# Patient Record
Sex: MALE | Race: BLACK OR AFRICAN AMERICAN | NOT HISPANIC OR LATINO | Employment: UNEMPLOYED | URBAN - METROPOLITAN AREA
[De-identification: names, ages, dates, MRNs, and addresses within clinical notes are randomized per-mention and may not be internally consistent; named-entity substitution may affect disease eponyms.]

---

## 2019-12-25 PROBLEM — R06.02 SOB (SHORTNESS OF BREATH): Status: ACTIVE | Noted: 2019-12-25

## 2019-12-26 PROBLEM — N28.1 RENAL CYST: Status: ACTIVE | Noted: 2019-12-26

## 2019-12-26 PROBLEM — A41.9 SEPSIS: Status: ACTIVE | Noted: 2019-12-26

## 2019-12-26 PROBLEM — J02.0 STREPTOCOCCAL PHARYNGITIS: Status: ACTIVE | Noted: 2019-12-26

## 2019-12-26 PROBLEM — D70.9 NEUTROPENIA: Status: ACTIVE | Noted: 2019-12-26

## 2019-12-26 PROBLEM — R10.9 ABDOMINAL PAIN: Status: ACTIVE | Noted: 2019-12-26

## 2019-12-26 PROBLEM — E11.9 TYPE 2 DIABETES MELLITUS, WITHOUT LONG-TERM CURRENT USE OF INSULIN: Status: ACTIVE | Noted: 2019-12-26

## 2019-12-27 PROBLEM — D70.9 NEUTROPENIA: Status: RESOLVED | Noted: 2019-12-26 | Resolved: 2019-12-27

## 2020-01-08 PROBLEM — K65.1 PELVIC ABSCESS IN MALE: Status: ACTIVE | Noted: 2020-01-08

## 2020-01-09 PROBLEM — C80.1 CARCINOMA: Status: ACTIVE | Noted: 2020-01-09

## 2020-01-15 PROBLEM — Z85.6 PERSONAL HISTORY OF CLL (CHRONIC LYMPHOCYTIC LEUKEMIA): Status: ACTIVE | Noted: 2020-01-09

## 2022-03-08 ENCOUNTER — TELEPHONE (OUTPATIENT)
Dept: SMOKING CESSATION | Facility: CLINIC | Age: 62
End: 2022-03-08
Payer: COMMERCIAL

## 2022-03-09 NOTE — TELEPHONE ENCOUNTER
Called to confirm appt attendance. Patient answered and asked to change the appt to 3/10/22 due to having another appt on the same day. I will reschedule appt for 3/10/22 at 9:30a

## 2022-03-10 ENCOUNTER — CLINICAL SUPPORT (OUTPATIENT)
Dept: SMOKING CESSATION | Facility: CLINIC | Age: 62
End: 2022-03-10
Payer: COMMERCIAL

## 2022-03-10 DIAGNOSIS — F17.200 NICOTINE DEPENDENCE: Primary | ICD-10-CM

## 2022-03-10 PROCEDURE — 99404 PR PREVENT COUNSEL,INDIV,60 MIN: ICD-10-PCS | Mod: S$GLB,,, | Performed by: GENERAL PRACTICE

## 2022-03-10 PROCEDURE — 99404 PREV MED CNSL INDIV APPRX 60: CPT | Mod: S$GLB,,, | Performed by: GENERAL PRACTICE

## 2022-03-10 RX ORDER — IBUPROFEN 200 MG
1 TABLET ORAL DAILY
Qty: 28 PATCH | Refills: 0 | Status: SHIPPED | OUTPATIENT
Start: 2022-03-10 | End: 2022-04-14 | Stop reason: SDUPTHER

## 2022-03-10 RX ORDER — DM/P-EPHED/ACETAMINOPH/DOXYLAM 30-7.5/3
2 LIQUID (ML) ORAL
Qty: 144 LOZENGE | Refills: 0 | Status: SHIPPED | OUTPATIENT
Start: 2022-03-10 | End: 2022-04-14 | Stop reason: SDUPTHER

## 2022-03-10 NOTE — PROGRESS NOTES
Met with patient in clinic to conduct intake appointment. Patient will be participating in weekly tobacco cessation meetings and will begin the prescribed tobacco cessation medication 21 mg patches and 2mg nicotine lozenges. FTND of 7 indicates a high level of dependence to tobacco. DELROY-D of 13 is perceived as no mental distress/ depression.

## 2022-03-17 ENCOUNTER — CLINICAL SUPPORT (OUTPATIENT)
Dept: SMOKING CESSATION | Facility: CLINIC | Age: 62
End: 2022-03-17
Payer: COMMERCIAL

## 2022-03-17 DIAGNOSIS — F17.200 NICOTINE DEPENDENCE: Primary | ICD-10-CM

## 2022-03-17 PROCEDURE — 99402 PREV MED CNSL INDIV APPRX 30: CPT | Mod: S$GLB,,, | Performed by: GENERAL PRACTICE

## 2022-03-17 PROCEDURE — 99402 PR PREVENT COUNSEL,INDIV,30 MIN: ICD-10-PCS | Mod: S$GLB,,, | Performed by: GENERAL PRACTICE

## 2022-03-17 NOTE — Clinical Note
Spoke with patient at length via phone regarding tobacco cessation progress. Patient remains on prescribed tobacco cessation medication regimen of 21 mg patch and 2mg lozenges without any negative side effects at this time. Patient informed me that he has cut down from 30 cigarettes to 4 cigarettes a day. I commended him on cutting down. He says that his wife is so proud of him. He really likes the lozenges and uses them often. Patient stated that he wants to quit because he has COPD. Goal this week is to smoke outside.  Discussed learned addiction model, cues/triggers, personal reasons for quitting, medications, and goals. The patient will continue with  therapy sessions and medication monitoring by CTTS. Prescribed medication management will be by physician. The patient denies any abnormal behavioral or mental changes at this time.

## 2022-03-17 NOTE — PROGRESS NOTES
Individual Follow-Up Form    3/17/2022    Quit Date: none    Clinical Status of Patient: Outpatient    Length of Service: 30 minutes    Continuing Medication: yes  Patches or Nicotine Lozenges    Other Medications: none     Target Symptoms: Withdrawal and medication side effects. The following were rated moderate (3) to severe (4) on TCRS:  · Moderate (3): none  · Severe (4): none    Comments: Spoke with patient at length via phone regarding tobacco cessation progress. Patient remains on prescribed tobacco cessation medication regimen of 21 mg patch and 2mg lozenges without any negative side effects at this time. Patient informed me that he has cut down from 30 cigarettes to 4 cigarettes a day. I commended him on cutting down. He says that his wife is so proud of him. He really likes the lozenges and uses them often. Patient stated that he wants to quit because he has COPD. Goal this week is to smoke outside.  Discussed learned addiction model, cues/triggers, personal reasons for quitting, medications, and goals. The patient will continue with  therapy sessions and medication monitoring by CTTS. Prescribed medication management will be by physician. The patient denies any abnormal behavioral or mental changes at this time.       Diagnosis: F17.200    Next Visit: 2 weeks

## 2022-03-31 ENCOUNTER — CLINICAL SUPPORT (OUTPATIENT)
Dept: SMOKING CESSATION | Facility: CLINIC | Age: 62
End: 2022-03-31
Payer: COMMERCIAL

## 2022-03-31 DIAGNOSIS — F17.200 NICOTINE DEPENDENCE: Primary | ICD-10-CM

## 2022-03-31 PROCEDURE — 99402 PREV MED CNSL INDIV APPRX 30: CPT | Mod: S$GLB,,, | Performed by: GENERAL PRACTICE

## 2022-03-31 PROCEDURE — 99402 PR PREVENT COUNSEL,INDIV,30 MIN: ICD-10-PCS | Mod: S$GLB,,, | Performed by: GENERAL PRACTICE

## 2022-03-31 NOTE — Clinical Note
Spoke with patient at length via phone regarding tobacco cessation progress. Patient remains on prescribed tobacco cessation medication regimen of 21 mg patch and 2mg lozenges without any negative side effects at this time. No refills needed at this time. Patient continues to smoke 4-5 cigarettes a day. Patient says that he has been going outside to smoke more often. I commend him for making change. Patient has CHF. He knows quitting smoking will help his heart and lungs. Triggers stress eating and boredom. Reviewed strategies, cues, and triggers. Introduced the negative impact of tobacco on health, the health advantages of discontinuing the use of tobacco, time line improved health changes after a quit, withdrawal issues to expect from nicotine and habit, and ways to achieve the goal of a quit. The patient will continue with  therapy sessions and medication monitoring by CTTS. Prescribed medication management will be by physician. The patient denies any abnormal behavioral or mental changes at this time.

## 2022-04-14 ENCOUNTER — CLINICAL SUPPORT (OUTPATIENT)
Dept: SMOKING CESSATION | Facility: CLINIC | Age: 62
End: 2022-04-14
Payer: COMMERCIAL

## 2022-04-14 DIAGNOSIS — F17.200 NICOTINE DEPENDENCE: ICD-10-CM

## 2022-04-14 PROCEDURE — 99404 PREV MED CNSL INDIV APPRX 60: CPT | Mod: S$GLB,,, | Performed by: GENERAL PRACTICE

## 2022-04-14 PROCEDURE — 99404 PR PREVENT COUNSEL,INDIV,60 MIN: ICD-10-PCS | Mod: S$GLB,,, | Performed by: GENERAL PRACTICE

## 2022-04-14 RX ORDER — IBUPROFEN 200 MG
1 TABLET ORAL DAILY
Qty: 28 PATCH | Refills: 0 | Status: SHIPPED | OUTPATIENT
Start: 2022-04-14 | End: 2022-05-11 | Stop reason: SDUPTHER

## 2022-04-14 RX ORDER — DM/P-EPHED/ACETAMINOPH/DOXYLAM 30-7.5/3
2 LIQUID (ML) ORAL
Qty: 288 LOZENGE | Refills: 0 | Status: SHIPPED | OUTPATIENT
Start: 2022-04-14 | End: 2022-05-11 | Stop reason: SDUPTHER

## 2022-04-14 NOTE — PROGRESS NOTES
Individual Follow-Up Form    4/14/2022    Quit Date: none    Clinical Status of Patient: Outpatient    Length of Service: 60 minutes    Continuing Medication: yes  Patches or Nicotine Lozenges    Other Medications: none     Target Symptoms: Withdrawal and medication side effects. The following were rated moderate (3) to severe (4) on TCRS:  · Moderate (3): none  · Severe (4): none    Comments: Met with patient in clinic regarding tobacco cessation progress. Patient remains on prescribed tobacco cessation medication regimen of 21 mg patch and 2mg lozenges without any negative side effects at this time. Refilled patches and lozenges. Patient continues to smoke 8 halves of cigarettes per day. I encouraged him to not smoke halves and helped him to understand the dangers of secondhand and relighting the cigarettes. Triggers are waking up, after meal and boredom in the evening. Goal is to put cigarettes in a different location, delay cigarettes in the morning and after meal with lozenges and find hobby in the evenings. Patient was given crossword puzzles to keep busy also. He understands the habit. Reviewed strategies, controlling environment, cues, triggers, new goals set. The patient will continue with  therapy sessions and medication monitoring by CTTS. Prescribed medication management will be by physician. The patient denies any abnormal behavioral or mental changes at this time.         Diagnosis: F17.200    Next Visit: 2 weeks

## 2022-04-14 NOTE — Clinical Note
Met with patient in clinic regarding tobacco cessation progress. Patient remains on prescribed tobacco cessation medication regimen of 21 mg patch and 2mg lozenges without any negative side effects at this time. Refilled patches and lozenges. Patient continues to smoke 8 halves of cigarettes per day. I encouraged him to not smoke halves and helped him to understand the dangers of secondhand and relighting the cigarettes. Triggers are waking up, after meal and boredom in the evening. Goal is to put cigarettes in a different location, delay cigarettes in the morning and after meal with lozenges and find hobby in the evenings. Patient was given crossword puzzles to keep busy also. He understands the habit. Reviewed strategies, controlling environment, cues, triggers, new goals set. The patient will continue with  therapy sessions and medication monitoring by CTTS. Prescribed medication management will be by physician. The patient denies any abnormal behavioral or mental changes at this time

## 2022-04-28 ENCOUNTER — CLINICAL SUPPORT (OUTPATIENT)
Dept: SMOKING CESSATION | Facility: CLINIC | Age: 62
End: 2022-04-28
Payer: COMMERCIAL

## 2022-04-28 DIAGNOSIS — F17.200 NICOTINE DEPENDENCE: Primary | ICD-10-CM

## 2022-04-28 PROCEDURE — 99402 PREV MED CNSL INDIV APPRX 30: CPT | Mod: S$GLB,,, | Performed by: GENERAL PRACTICE

## 2022-04-28 PROCEDURE — 99402 PR PREVENT COUNSEL,INDIV,30 MIN: ICD-10-PCS | Mod: S$GLB,,, | Performed by: GENERAL PRACTICE

## 2022-04-28 NOTE — PROGRESS NOTES
Individual Follow-Up Form    4/28/2022    Quit Date: none    Clinical Status of Patient: Outpatient    Length of Service: 30 minutes    Continuing Medication: yes  Patches or Nicotine Lozenges    Other Medications: none     Target Symptoms: Withdrawal and medication side effects. The following were rated moderate (3) to severe (4) on TCRS:  · Moderate (3): none  · Severe (4): none    Comments: Spoke with patient at length via phone regarding tobacco cessation progress. Patient remains on prescribed tobacco cessation medication regimen of 21 mg patch and 2mg lozenges without any negative side effects at this time. No refills needed. Patient has decreased from 8 halves to 3 cigarettes per day. He is not smoking halves. Patient says he can wait until about 10 am before he gives in to smoke. I commended him on cutting down and meeting goals. Goal this week is to use deep breathing technique to relax and help with pain and drink water then use a lozenge to delay having a cigarette. Reviewed strategies, controlling environment, cues, triggers, new goals set. The patient will continue with therapy sessions and medication monitoring by CTTS. Prescribed medication management will be by physician. The patient denies any abnormal behavioral or mental changes at this time.     Diagnosis: F17.200    Next Visit: 2 weeks

## 2022-04-28 NOTE — Clinical Note
Spoke with patient at length via phone regarding tobacco cessation progress. Patient remains on prescribed tobacco cessation medication regimen of 21 mg patch and 2mg lozenges without any negative side effects at this time. No refills needed. Patient has decreased from 8 halves to 3 cigarettes per day. He is not smoking halves. Patient says he can wait until about 10 am before he gives in to smoke. I commended him on cutting down and meeting goals. Goal this week is to use deep breathing technique to relax and help with pain and drink water then use a lozenge to delay having a cigarette. Reviewed strategies, controlling environment, cues, triggers, new goals set. The patient will continue with therapy sessions and medication monitoring by CTTS. Prescribed medication management will be by physician. The patient denies any abnormal behavioral or mental changes at this time.

## 2022-05-11 ENCOUNTER — CLINICAL SUPPORT (OUTPATIENT)
Dept: SMOKING CESSATION | Facility: CLINIC | Age: 62
End: 2022-05-11
Payer: COMMERCIAL

## 2022-05-11 DIAGNOSIS — F17.200 NICOTINE DEPENDENCE: ICD-10-CM

## 2022-05-11 PROCEDURE — 99402 PREV MED CNSL INDIV APPRX 30: CPT | Mod: S$GLB,,, | Performed by: GENERAL PRACTICE

## 2022-05-11 PROCEDURE — 99402 PR PREVENT COUNSEL,INDIV,30 MIN: ICD-10-PCS | Mod: S$GLB,,, | Performed by: GENERAL PRACTICE

## 2022-05-11 RX ORDER — DM/P-EPHED/ACETAMINOPH/DOXYLAM 30-7.5/3
2 LIQUID (ML) ORAL
Qty: 288 LOZENGE | Refills: 0 | Status: SHIPPED | OUTPATIENT
Start: 2022-05-11 | End: 2022-05-24 | Stop reason: SDUPTHER

## 2022-05-11 RX ORDER — IBUPROFEN 200 MG
1 TABLET ORAL DAILY
Qty: 14 PATCH | Refills: 0 | Status: SHIPPED | OUTPATIENT
Start: 2022-05-11 | End: 2022-05-24 | Stop reason: DRUGHIGH

## 2022-05-11 NOTE — Clinical Note
Spoke with patient at length via phone regarding tobacco cessation progress because he forgot about the clinic appointment. Patient remains on prescribed tobacco cessation medication regimen of 21 mg patch and 2mg lozenges without any negative side effects at this time. Refilled patches and lozenges. Patient informed me that he had his last cigarette on 4/29/22. His quit date is 4/30/22. I commended him on quitting. His trigger use to be being bored in the evening but he does not have a problem now since he about to work int he yard more. Reviewed strategies, habitual behavior, high risks situations, understanding urges and cravings, stress and relaxation with open discussion and additional interventions, Introduced lapses, relapses, understanding them. The patient will continue with therapy sessions and medication monitoring by CTTS. Prescribed medication management will be by physician. The patient denies any abnormal behavioral or mental changes at this time.

## 2022-05-11 NOTE — PROGRESS NOTES
Individual Follow-Up Form    5/11/2022    Quit Date: 4/30/22    Clinical Status of Patient: Outpatient    Length of Service: 30 minutes    Continuing Medication: yes  Patches    Other Medications: none     Target Symptoms: Withdrawal and medication side effects. The following were rated moderate (3) to severe (4) on TCRS:  · Moderate (3): none  · Severe (4): none    Comments: Spoke with patient at length via phone regarding tobacco cessation progress because he forgot about the clinic appointment. Patient remains on prescribed tobacco cessation medication regimen of 21 mg patch and 2mg lozenges without any negative side effects at this time. Refilled patches and lozenges. Patient informed me that he had his last cigarette on 4/29/22. His quit date is 4/30/22. I commended him on quitting. His trigger use to be being bored in the evening but he does not have a problem now since he about to work int he yard more. Reviewed strategies, habitual behavior, high risks situations, understanding urges and cravings, stress and relaxation with open discussion and additional interventions, Introduced lapses, relapses, understanding them. The patient will continue with therapy sessions and medication monitoring by CTTS. Prescribed medication management will be by physician. The patient denies any abnormal behavioral or mental changes at this time.     Diagnosis: F17.200    Next Visit: 2 weeks

## 2022-05-24 ENCOUNTER — CLINICAL SUPPORT (OUTPATIENT)
Dept: SMOKING CESSATION | Facility: CLINIC | Age: 62
End: 2022-05-24
Payer: COMMERCIAL

## 2022-05-24 DIAGNOSIS — F17.200 NICOTINE DEPENDENCE: Primary | ICD-10-CM

## 2022-05-24 PROCEDURE — 99404 PR PREVENT COUNSEL,INDIV,60 MIN: ICD-10-PCS | Mod: S$GLB,,, | Performed by: GENERAL PRACTICE

## 2022-05-24 PROCEDURE — 99404 PREV MED CNSL INDIV APPRX 60: CPT | Mod: S$GLB,,, | Performed by: GENERAL PRACTICE

## 2022-05-24 RX ORDER — IBUPROFEN 200 MG
1 TABLET ORAL DAILY
Qty: 28 PATCH | Refills: 0 | Status: SHIPPED | OUTPATIENT
Start: 2022-05-24 | End: 2022-06-22 | Stop reason: SDUPTHER

## 2022-05-24 RX ORDER — DM/P-EPHED/ACETAMINOPH/DOXYLAM 30-7.5/3
2 LIQUID (ML) ORAL
Qty: 288 LOZENGE | Refills: 0 | Status: SHIPPED | OUTPATIENT
Start: 2022-05-24 | End: 2022-06-07

## 2022-05-24 NOTE — Clinical Note
et with patient at length in clinic regarding tobacco cessation progress. Patient remains on prescribed tobacco cessation medication regimen of 21 mg patch and 2mg nicotine lozenges without any negative side effects at this time. Decreased to 14mg patches and refilled 2mg lozenges. Patient stated the taste for cigarettes is gone. He says that he feels much better and can walk a little longer without getting out of breath. He also noticed he can smell things better and he is saving lots of money. Daughter smokes in the house and that bothers him so he ask her to go outside. Reviewed strategies, cues, triggers, high risk situations, lapses, relapses, diet, exercise, stress, relaxation, sleep, habitual behavior, and life style changes. The patient will continue with  therapy sessions and medication monitoring by CTTS. Prescribed medication management will be by physician. The patient denies any abnormal behavioral or mental changes at this time.

## 2022-05-24 NOTE — PROGRESS NOTES
Individual Follow-Up Form    5/24/2022    Quit Date: 4/30/22    Clinical Status of Patient: Outpatient    Length of Service: 60 minutes    Continuing Medication: yes  Patches or Nicotine Lozenges    Other Medications: none     Target Symptoms: Withdrawal and medication side effects. The following were rated moderate (3) to severe (4) on TCRS:  · Moderate (3): none  · Severe (4): none    Comments:  Met with patient at length in clinic regarding tobacco cessation progress. Patient remains on prescribed tobacco cessation medication regimen of 21 mg patch and 2mg nicotine lozenges without any negative side effects at this time. Decreased to 14mg patches and refilled 2mg lozenges. Patient stated the taste for cigarettes is gone. He says that he feels much better and can walk a little longer without getting out of breath. He also noticed he can smell things better and he is saving lots of money. Daughter smokes in the house and that bothers him so he ask her to go outside. Reviewed strategies, cues, triggers, high risk situations, lapses, relapses, diet, exercise, stress, relaxation, sleep, habitual behavior, and life style changes. The patient will continue with  therapy sessions and medication monitoring by CTTS. Prescribed medication management will be by physician. The patient denies any abnormal behavioral or mental changes at this time.           Diagnosis: F17.200    Next Visit: 2 weeks

## 2022-06-07 ENCOUNTER — CLINICAL SUPPORT (OUTPATIENT)
Dept: SMOKING CESSATION | Facility: CLINIC | Age: 62
End: 2022-06-07
Payer: COMMERCIAL

## 2022-06-07 DIAGNOSIS — F17.200 NICOTINE DEPENDENCE: Primary | ICD-10-CM

## 2022-06-07 PROCEDURE — 99402 PREV MED CNSL INDIV APPRX 30: CPT | Mod: S$GLB,,, | Performed by: GENERAL PRACTICE

## 2022-06-07 PROCEDURE — 99402 PR PREVENT COUNSEL,INDIV,30 MIN: ICD-10-PCS | Mod: S$GLB,,, | Performed by: GENERAL PRACTICE

## 2022-06-07 NOTE — PROGRESS NOTES
Individual Follow-Up Form    6/7/2022    Quit Date: 4/30/22    Clinical Status of Patient: Outpatient    Length of Service: 30 minutes    Continuing Medication: yes  Patches    Other Medications: lozenges no     Target Symptoms: Withdrawal and medication side effects. The following were  rated moderate (3) to severe (4) on TCRS:  · Moderate (3): none  · Severe (4): none    Comments: Spoke with patient at length via phone regarding tobacco cessation progress. Patient remains on prescribed tobacco cessation medication regimen of 14 mg patch without any negative side effects at this time. He does not use the lozenges due to taste. He uses peppermint or gum if he wants something in his mouth. No refill needed at this time. Patient stated that he is doing real good. He does not have any cravings. No problem with diet or exercise or sleep. He enjoys the puzzle books I gave. Reviewed strategies, cues, triggers, high risk situations, lapses, relapses, diet, exercise, stress, relaxation, sleep, habitual behavior, and life style changes. The patient will continue with  therapy sessions and medication monitoring by CTTS. Prescribed medication management will be by physician. The patient denies any abnormal behavioral or mental changes at this time.     Diagnosis: F17.200    Next Visit: 2 weeks

## 2022-06-07 NOTE — Clinical Note
Spoke with patient at length via phone regarding tobacco cessation progress. Patient remains on prescribed tobacco cessation medication regimen of 14 mg patch without any negative side effects at this time. He does not use the lozenges due to taste. He uses peppermint or gum if he wants something in his mouth. No refill needed at this time. Patient stated that he is doing real good. He does not have any cravings. No problem with diet or exercise or sleep. He enjoys the puzzle books I gave. Reviewed strategies, cues, triggers, high risk situations, lapses, relapses, diet, exercise, stress, relaxation, sleep, habitual behavior, and life style changes. The patient will continue with  therapy sessions and medication monitoring by CTTS. Prescribed medication management will be by physician. The patient denies any abnormal behavioral or mental changes at this time.

## 2022-06-22 ENCOUNTER — CLINICAL SUPPORT (OUTPATIENT)
Dept: SMOKING CESSATION | Facility: CLINIC | Age: 62
End: 2022-06-22
Payer: COMMERCIAL

## 2022-06-22 DIAGNOSIS — F17.200 NICOTINE DEPENDENCE: Primary | ICD-10-CM

## 2022-06-22 PROCEDURE — 99402 PR PREVENT COUNSEL,INDIV,30 MIN: ICD-10-PCS | Mod: S$GLB,,, | Performed by: GENERAL PRACTICE

## 2022-06-22 PROCEDURE — 99402 PREV MED CNSL INDIV APPRX 30: CPT | Mod: S$GLB,,, | Performed by: GENERAL PRACTICE

## 2022-06-22 RX ORDER — DM/P-EPHED/ACETAMINOPH/DOXYLAM 30-7.5/3
2 LIQUID (ML) ORAL
Qty: 144 LOZENGE | Refills: 0 | Status: SHIPPED | OUTPATIENT
Start: 2022-06-22 | End: 2022-07-18 | Stop reason: SDUPTHER

## 2022-06-22 RX ORDER — IBUPROFEN 200 MG
1 TABLET ORAL DAILY
Qty: 28 PATCH | Refills: 0 | Status: SHIPPED | OUTPATIENT
Start: 2022-06-22 | End: 2022-07-18 | Stop reason: SDUPTHER

## 2022-06-22 NOTE — PROGRESS NOTES
Individual Follow-Up Form    6/22/2022    Quit Date: 4/30/22    Clinical Status of Patient: Outpatient    Length of Service: 30 minutes    Continuing Medication: yes  Patches or Nicotine Lozenges    Other Medications: none     Target Symptoms: Withdrawal and medication side effects. The following were rated moderate (3) to severe (4) on TCRS:  · Moderate (3): none  · Severe (4): none    Comments: Spoke with patient at length via phone regarding tobacco cessation progress. Patient remains on prescribed tobacco cessation medication regimen of 14 mg patch and 2mg lozenges (3) without any negative side effects at this time. Refilled patches and lozenges. Patient stated that he is continuing to do well. No problem eating, sleeping or stress issues. Patient concerned about his heart. What for results at his next visit. Reviewed strategies, cues, triggers, high risk situations, lapses, relapses, diet, exercise, stress, relaxation, sleep, habitual behavior, and life style changes. The patient will continue with  therapy sessions and medication monitoring by CTTS. Prescribed medication management will be by physician. The patient denies any abnormal behavioral or mental changes at this time.     Diagnosis: F17.200    Next Visit: 2 weeks

## 2022-06-22 NOTE — Clinical Note
Spoke with patient at length via phone regarding tobacco cessation progress. Patient remains on prescribed tobacco cessation medication regimen of 14 mg patch and 2mg lozenges (3) without any negative side effects at this time. Refilled patches and lozenges. Patient stated that he is continuing to do well. No problem eating, sleeping or stress issues. Patient concerned about his heart. What for results at his next visit. Reviewed strategies, cues, triggers, high risk situations, lapses, relapses, diet, exercise, stress, relaxation, sleep, habitual behavior, and life style changes. The patient will continue with  therapy sessions and medication monitoring by CTTS. Prescribed medication management will be by physician. The patient denies any abnormal behavioral or mental changes at this time.

## 2022-07-18 ENCOUNTER — CLINICAL SUPPORT (OUTPATIENT)
Dept: SMOKING CESSATION | Facility: CLINIC | Age: 62
End: 2022-07-18
Payer: COMMERCIAL

## 2022-07-18 DIAGNOSIS — F17.200 NICOTINE DEPENDENCE: Primary | ICD-10-CM

## 2022-07-18 PROCEDURE — 99402 PR PREVENT COUNSEL,INDIV,30 MIN: ICD-10-PCS | Mod: S$GLB,,, | Performed by: GENERAL PRACTICE

## 2022-07-18 PROCEDURE — 99402 PREV MED CNSL INDIV APPRX 30: CPT | Mod: S$GLB,,, | Performed by: GENERAL PRACTICE

## 2022-07-18 RX ORDER — IBUPROFEN 200 MG
1 TABLET ORAL DAILY
Qty: 28 PATCH | Refills: 0 | Status: SHIPPED | OUTPATIENT
Start: 2022-07-18 | End: 2022-09-15

## 2022-07-18 RX ORDER — MICONAZOLE NITRATE 2 %
2 CREAM (GRAM) TOPICAL
Qty: 220 EACH | Refills: 0 | Status: SHIPPED | OUTPATIENT
Start: 2022-07-18 | End: 2022-08-10

## 2022-07-18 RX ORDER — DM/P-EPHED/ACETAMINOPH/DOXYLAM 30-7.5/3
2 LIQUID (ML) ORAL
Qty: 144 LOZENGE | Refills: 0 | Status: SHIPPED | OUTPATIENT
Start: 2022-07-18 | End: 2022-10-13

## 2022-07-18 NOTE — Clinical Note
Spoke with patient at length via phone regarding tobacco cessation progress. Patient remains on prescribed tobacco cessation medication regimen of 14 mg patch and 2mg lozenges (3) without any negative side effects at this time. Refilled patches and ordered gum. Patient stated that he is still tobacco free. He has not been feeling well he is getting over the last day of quarantining from having COVID. Patient stated that his wife continue to smoke but that does not trigger him. Reviewed strategies, cues, triggers, high risk situations, lapses, relapses, diet, exercise, stress, relaxation, sleep, habitual behavior, and life style changes. The patient will continue with  therapy sessions and medication monitoring by CTTS. Prescribed medication management will be by physician. The patient denies any abnormal behavioral or mental changes at this time.

## 2022-07-18 NOTE — PROGRESS NOTES
Individual Follow-Up Form    7/18/2022    Quit Date: 4/30/22      Clinical Status of Patient: Outpatient    Length of Service: 30 minutes    Continuing Medication: yes  Patches or Nicotine Lozenges    Other Medications: none     Target Symptoms: Withdrawal and medication side effects. The following were rated moderate (3) to severe (4) on TCRS:  · Moderate (3): none  · Severe (4): none    Comments:  Spoke with patient at length via phone regarding tobacco cessation progress. Patient remains on prescribed tobacco cessation medication regimen of 14 mg patch and 2mg lozenges (3) without any negative side effects at this time. Refilled patches and ordered gum. Patient stated that he is still tobacco free. He has not been feeling well he is getting over the last day of quarantining from having COVID. Patient stated that his wife continue to smoke but that does not trigger him. Reviewed strategies, cues, triggers, high risk situations, lapses, relapses, diet, exercise, stress, relaxation, sleep, habitual behavior, and life style changes. The patient will continue with  therapy sessions and medication monitoring by CTTS. Prescribed medication management will be by physician. The patient denies any abnormal behavioral or mental changes at this time.       Diagnosis: F17.200    Next Visit: 2 weeks

## 2022-08-10 ENCOUNTER — CLINICAL SUPPORT (OUTPATIENT)
Dept: SMOKING CESSATION | Facility: CLINIC | Age: 62
End: 2022-08-10
Payer: COMMERCIAL

## 2022-08-10 ENCOUNTER — TELEPHONE (OUTPATIENT)
Dept: SMOKING CESSATION | Facility: CLINIC | Age: 62
End: 2022-08-10
Payer: COMMERCIAL

## 2022-08-10 DIAGNOSIS — F17.200 NICOTINE DEPENDENCE: Primary | ICD-10-CM

## 2022-08-10 PROCEDURE — 99402 PREV MED CNSL INDIV APPRX 30: CPT | Mod: S$GLB,,, | Performed by: GENERAL PRACTICE

## 2022-08-10 PROCEDURE — 99402 PR PREVENT COUNSEL,INDIV,30 MIN: ICD-10-PCS | Mod: S$GLB,,, | Performed by: GENERAL PRACTICE

## 2022-08-10 NOTE — PROGRESS NOTES
Individual Follow-Up Form    8/10/2022    Quit Date: 4/30/22    Clinical Status of Patient: Outpatient    Length of Service: 30 minutes    Continuing Medication: yes  Patches    Other Medications: 2mg lozenges     Target Symptoms: Withdrawal and medication side effects. The following were rated moderate (3) to severe (4) on TCRS:  · Moderate (3): none  · Severe (4): none    Comments: Spoke with patient at length via phone regarding tobacco cessation progress. Patient remains on prescribed tobacco cessation medication regimen of 14 mg patch and 2mg lozenges (3) without any negative side effects at this time. No refill needed at this time. Patient stated that he is still tobacco free. I commended him on staying quit. Patient informed me that  Reviewed strategies, cues, triggers, high risk situations, lapses, relapses, diet, exercise, stress, relaxation, sleep, habitual behavior, and life style changes. The patient will continue with  therapy sessions and medication monitoring by CTTS. Prescribed medication management will be by physician. The patient denies any abnormal behavioral or mental changes at this time.     Diagnosis: F17.200    Next Visit: 2 weeks

## 2022-08-10 NOTE — TELEPHONE ENCOUNTER
Called patient to follow up regarding tobacco cessation progress. Patient wife answered and stated that he was in [facility] with pneumonia. I left a message and asked that she inform him that I called and I would call back again soon

## 2022-08-10 NOTE — Clinical Note
Spoke with patient at length via phone regarding tobacco cessation progress. Patient remains on prescribed tobacco cessation medication regimen of 14 mg patch and 2mg lozenges (3) without any negative side effects at this time. No refill needed at this time. Patient stated that he is still tobacco free. I commended him on staying quit. Patient informed me that  Reviewed strategies, cues, triggers, high risk situations, lapses, relapses, diet, exercise, stress, relaxation, sleep, habitual behavior, and life style changes. The patient will continue with  therapy sessions and medication monitoring by CTTS. Prescribed medication management will be by physician. The patient denies any abnormal behavioral or mental changes at this time.

## 2022-08-31 ENCOUNTER — TELEPHONE (OUTPATIENT)
Dept: SMOKING CESSATION | Facility: CLINIC | Age: 62
End: 2022-08-31
Payer: COMMERCIAL

## 2022-09-15 ENCOUNTER — CLINICAL SUPPORT (OUTPATIENT)
Dept: SMOKING CESSATION | Facility: CLINIC | Age: 62
End: 2022-09-15
Payer: COMMERCIAL

## 2022-09-15 ENCOUNTER — TELEPHONE (OUTPATIENT)
Dept: SMOKING CESSATION | Facility: CLINIC | Age: 62
End: 2022-09-15
Payer: COMMERCIAL

## 2022-09-15 DIAGNOSIS — F17.200 NICOTINE DEPENDENCE: Primary | ICD-10-CM

## 2022-09-15 PROCEDURE — 99402 PREV MED CNSL INDIV APPRX 30: CPT | Mod: S$GLB,,, | Performed by: GENERAL PRACTICE

## 2022-09-15 PROCEDURE — 99402 PR PREVENT COUNSEL,INDIV,30 MIN: ICD-10-PCS | Mod: S$GLB,,, | Performed by: GENERAL PRACTICE

## 2022-09-15 NOTE — PROGRESS NOTES
Individual Follow-Up Form    9/15/2022    Quit Date: 4/30/22    Clinical Status of Patient: Outpatient    Length of Service: 30 minutes    Continuing Medication: yes  Nicotine Lozenges    Other Medications: nicotine patches no     Target Symptoms: Withdrawal and medication side effects. The following were rated moderate (3) to severe (4) on TCRS:  Moderate (3): none  Severe (4): none    Comments: Spoke with patient at length via phone regarding tobacco cessation progress. Patient remains on prescribed tobacco cessation medication regimen of 2mg lozenges (PRN) without any negative side effects at this time. He has discontinued the nicotine patches. No refill needed at this time. Patient stated that he is still tobacco free. I commended him on staying quit. Patient informed me that he does not have the urge or desire to smoke. Being around other people does not bother him. Reviewed strategies, cues, triggers, high risk situations, lapses, relapses, diet, exercise, stress, relaxation, sleep, habitual behavior, and life style changes. The patient will continue with  therapy sessions and medication monitoring by CTTS. Prescribed medication management will be by physician. The patient denies any abnormal behavioral or mental changes at this time.     Diagnosis: F17.200    Next Visit: 2 weeks

## 2022-10-04 NOTE — Clinical Note
Spoke with patient at length via phone regarding tobacco cessation progress. Patient remains on prescribed tobacco cessation medication regimen of 2mg lozenges (PRN) without any negative side effects at this time. He has discontinued the nicotine patches. No refill needed at this time. Patient stated that he is still tobacco free. I commended him on staying quit. Patient informed me that he does not have the urge or desire to smoke. Being around other people does not bother him. Reviewed strategies, cues, triggers, high risk situations, lapses, relapses, diet, exercise, stress, relaxation, sleep, habitual behavior, and life style changes. The patient will continue with  therapy sessions and medication monitoring by [facility]. Prescribed medication management will be by physician. The patient denies any abnormal behavioral or mental changes at this time. 
No

## 2022-10-06 ENCOUNTER — CLINICAL SUPPORT (OUTPATIENT)
Dept: SMOKING CESSATION | Facility: CLINIC | Age: 62
End: 2022-10-06
Payer: COMMERCIAL

## 2022-10-06 DIAGNOSIS — F17.200 NICOTINE DEPENDENCE: Primary | ICD-10-CM

## 2022-10-06 PROCEDURE — 99407 PR TOBACCO USE CESSATION INTENSIVE >10 MINUTES: ICD-10-PCS | Mod: S$GLB,,, | Performed by: GENERAL PRACTICE

## 2022-10-06 PROCEDURE — 99999 PR PBB SHADOW E&M-EST. PATIENT-LVL I: ICD-10-PCS | Mod: PBBFAC,,,

## 2022-10-06 PROCEDURE — 99999 PR PBB SHADOW E&M-EST. PATIENT-LVL I: CPT | Mod: PBBFAC,,,

## 2022-10-06 PROCEDURE — 99407 BEHAV CHNG SMOKING > 10 MIN: CPT | Mod: S$GLB,,, | Performed by: GENERAL PRACTICE

## 2022-10-06 NOTE — PROGRESS NOTES
Spoke with patient today in regard to smoking cessation progress 3 and 6 month telephone follow up, he states that he is tobacco free.  Commended patient on the accomplishments thus far.  Informed patient of benefit period, future follow up, and contact information if any further help or support is needed.  Will complete smart form for 3 and 6 month follow up on Quit attempt #1     Created Addendum to review notes and verify that info was correct

## 2022-10-13 ENCOUNTER — CLINICAL SUPPORT (OUTPATIENT)
Dept: SMOKING CESSATION | Facility: CLINIC | Age: 62
End: 2022-10-13
Payer: COMMERCIAL

## 2022-10-13 DIAGNOSIS — F17.200 NICOTINE DEPENDENCE: Primary | ICD-10-CM

## 2022-10-13 PROCEDURE — 99402 PREV MED CNSL INDIV APPRX 30: CPT | Mod: S$GLB,,, | Performed by: GENERAL PRACTICE

## 2022-10-13 PROCEDURE — 99402 PR PREVENT COUNSEL,INDIV,30 MIN: ICD-10-PCS | Mod: S$GLB,,, | Performed by: GENERAL PRACTICE

## 2022-10-13 NOTE — Clinical Note
Spoke with patient at length via phone regarding tobacco cessation progress. Patient has discontinued all tobacco cessation medications. He says he does not need the medication and that he does not have the urge or desire to smoke. Being around other people does not bother him. Patient stated that he is still tobacco free. I commended him on staying quit. Patient stated he has not been stressed and his eating habit has not changed. Reviewed strategies, cues, triggers, high risk situations, lapses, relapses, diet, exercise, stress, relaxation, sleep, habitual behavior, and life style changes. Benefit ends 11/15/22. The patient will continue with therapy session. The patient denies any abnormal behavioral or mental changes at this time.

## 2022-10-13 NOTE — PROGRESS NOTES
Individual Follow-Up Form    10/13/2022    Quit Date: 4/30/22    Clinical Status of Patient: Outpatient    Length of Service: 30 minutes    Continuing Medication: no    Other Medications: none     Target Symptoms: Withdrawal and medication side effects. The following were  rated moderate (3) to severe (4) on TCRS:  Moderate (3): none  Severe (4): none    Comments: Spoke with patient at length via phone regarding tobacco cessation progress. Patient has discontinued all tobacco cessation medications. He says he does not need the medication and that he does not have the urge or desire to smoke. Being around other people does not bother him. Patient stated that he is still tobacco free. I commended him on staying quit. Patient stated he has not been stressed and his eating habit has not changed. Reviewed strategies, cues, triggers, high risk situations, lapses, relapses, diet, exercise, stress, relaxation, sleep, habitual behavior, and life style changes. Benefit ends 11/15/22. The patient will continue with therapy session. The patient denies any abnormal behavioral or mental changes at this time.     Diagnosis: F17.200    Next Visit: 2 weeks

## 2022-11-07 ENCOUNTER — CLINICAL SUPPORT (OUTPATIENT)
Dept: SMOKING CESSATION | Facility: CLINIC | Age: 62
End: 2022-11-07
Payer: COMMERCIAL

## 2022-11-07 DIAGNOSIS — F17.200 NICOTINE DEPENDENCE: Primary | ICD-10-CM

## 2022-11-07 PROCEDURE — 99401 PR PREVENT COUNSEL,INDIV,15 MIN: ICD-10-PCS | Mod: S$GLB,,, | Performed by: GENERAL PRACTICE

## 2022-11-07 PROCEDURE — 99401 PREV MED CNSL INDIV APPRX 15: CPT | Mod: S$GLB,,, | Performed by: GENERAL PRACTICE

## 2022-11-07 NOTE — PROGRESS NOTES
Individual Follow-Up Form    11/7/2022    Quit Date: 4/30/22    Clinical Status of Patient: Outpatient    Length of Service: 15 minutes    Continuing Medication: no    Other Medications: none     Target Symptoms: Withdrawal and medication side effects. The following were rated moderate (3) to severe (4) on TCRS:  Moderate (3): none  Severe (4): none    Comments: Spoke with patient at length via phone regarding tobacco cessation progress. Patient has discontinued all tobacco cessation medications and he does not have the urge or desire to smoke. Patient stated that he is still tobacco free. Being around other people does not bother him. I commended him on staying quit. Patient says he has not issues with his diet, weight gain nor stressors. Reviewed benefit ends 11/15/22. The patient stated that he does not need to remain in the program. I encouraged him to contact us if he were to slip an or relapses.     Diagnosis: F17.200    Next Visit: 2 weeks

## 2023-03-15 ENCOUNTER — CLINICAL SUPPORT (OUTPATIENT)
Dept: SMOKING CESSATION | Facility: CLINIC | Age: 63
End: 2023-03-15
Payer: COMMERCIAL

## 2023-03-15 DIAGNOSIS — F17.200 NICOTINE DEPENDENCE: Primary | ICD-10-CM

## 2023-03-15 PROCEDURE — 99999 PR PBB SHADOW E&M-EST. PATIENT-LVL I: CPT | Mod: PBBFAC,,,

## 2023-03-15 PROCEDURE — 99999 PR PBB SHADOW E&M-EST. PATIENT-LVL I: ICD-10-PCS | Mod: PBBFAC,,,

## 2023-03-15 NOTE — PROGRESS NOTES
Spoke with patient today in regard to smoking cessation progress for 12 mo telephone f/u, he states tobacco free. Commended patient on the accomplishment. Informed patient of benefit period and contact information if any further help or support is needed. Will resolve episode and complete smart form for Quit attempt #1.

## 2025-04-22 ENCOUNTER — TELEPHONE (OUTPATIENT)
Dept: SMOKING CESSATION | Facility: CLINIC | Age: 65
End: 2025-04-22
Payer: COMMERCIAL

## 2025-04-22 NOTE — TELEPHONE ENCOUNTER
Patient called to chat and inform me that he is still quit smoking. April 30, 2025 will make 3 years. I commended him on staying quit.